# Patient Record
(demographics unavailable — no encounter records)

---

## 2024-11-15 NOTE — DATA REVIEWED
[de-identified] : 2/5/2024: MRI brain.  No evidence of acute ischemia.  Recent right parietal craniotomy, hemorrhagic/proteinaceous subdural fluid collection right cerebral convexity appears grossly stable when compared to recent CT head.  Stable mass effect and midline shift [de-identified] : 7/23/24: Cognitive testing; Global cognitive score: not scored too many components missing More than 1 standard deviation below average in domains: Attention 75.8 Memory, executive function, information processing, visual-spatial function, verbal function and motor skills not assessed due to insufficient data [de-identified] : 5/7/2024: CT head: In comparison to 1/26/2024, interval decrease in right convexity subdural hematoma with minimal midline shift. Given that the prior exam was performed > 3 months ago + heterogeneous density of current subdural hematoma, alternate possibility is interval resolution with recurrence 2/27/2024: CT brain: No significant interval change compared with 2/19/2024. 2/19/2024: CT brain: Stable predominantly low-density right frontal parietal subdural hematoma with midline shift to the left compared with 2/13/2024

## 2024-11-15 NOTE — PHYSICAL EXAM
[General Appearance - Alert] : alert [General Appearance - In No Acute Distress] : in no acute distress [Oriented To Time, Place, And Person] : oriented to person, place, and time [Impaired Insight] : insight and judgment were intact [Person] : oriented to person [Place] : oriented to place [Time] : oriented to time [Registration Intact] : recent registration memory intact [Naming Objects] : no difficulty naming common objects [Repeating Phrases] : no difficulty repeating a phrase [Comprehension] : comprehension intact [Cranial Nerves Optic (II)] : visual acuity intact bilaterally,  visual fields full to confrontation, pupils equal round and reactive to light [Cranial Nerves Oculomotor (III)] : extraocular motion intact [Cranial Nerves Trigeminal (V)] : facial sensation intact symmetrically [Cranial Nerves Facial (VII)] : face symmetrical [Cranial Nerves Vestibulocochlear (VIII)] : hearing was intact bilaterally [Cranial Nerves Glossopharyngeal (IX)] : tongue and palate midline [Cranial Nerves Accessory (XI - Cranial And Spinal)] : head turning and shoulder shrug symmetric [Cranial Nerves Hypoglossal (XII)] : there was no tongue deviation with protrusion [Sensation Tactile Decrease] : light touch was intact [Sensation Pain / Temperature Decrease] : pain and temperature was intact [2+] : Brachioradialis left 2+ [1+] : Patella left 1+ [0] : Ankle jerk left 0 [PERRL With Normal Accommodation] : pupils were equal in size, round, reactive to light, with normal accommodation [Extraocular Movements] : extraocular movements were intact [Full Visual Field] : full visual field [Outer Ear] : the ears and nose were normal in appearance [Hearing Threshold Finger Rub Not Armstrong] : hearing was normal [] : the neck was supple [Neck Cervical Mass (___cm)] : no neck mass was observed [___ / 5] : Visuospatial / Executive: [unfilled] / 5 [___ / 3] : Attention (Serial 7 subtraction): [unfilled] / 3 [___ / 1] : Fluency: [unfilled] / 1 [___ / 2] : Abstraction: [unfilled] / 2 [___ / 5] : Delayed Recall: [unfilled] / 5 [___ / 6] : Orientation: [unfilled] / 6 [Fluency] : fluency not intact [Paresis Pronator Drift Right-Sided] : no pronator drift on the right [Paresis Pronator Drift Left-Sided] : no pronator drift on the left [MocaTotal] : 19 [FreeTextEntry6] : Motor: slightly slow right hand fine motor movements, slight weakness right dorsiflexors, all other muscle groups 5/5. Mild hypertonia - spasticity R > L side  [FreeTextEntry8] : Gait/balance: unstable, tends to drag his right foot

## 2024-11-15 NOTE — REASON FOR VISIT
[Follow-Up: _____] : a [unfilled] follow-up visit [FreeTextEntry1] : For cognitive testing [place] and for seizure medication adjustment

## 2024-11-15 NOTE — DISCUSSION/SUMMARY
[FreeTextEntry1] : 76-year-old RH M with PMHx of sleep apnea, HLD, RA on Rasuvo QW, has had recurring SDH's over the past year requiring 2 bur hole procedures + craniotomy on 1/30/2024, presented for evaluation of short-term memory issues/cognitive decline, occasional headaches, intermittent tinnitus left ear, some word finding difficulty. MRI brain-2/5/24 -no acute ischemia, right parietal craniotomy, hemorrhagic/proteinaceous subdural fluid collection right cerebral convexity appears grossly stable when compared to recent CT head.   # Most likley Vascular Demetia - numerous SDH's. MoCa Score 19/30, most significant deficits noted in visual-spatial, attention and delayed recall, computer-based cognitive test at the last visit was incomplete  unable to score as too many components were missing . Prior MoCA with Dr. Ortiz  Score 15/30 4/8/20240  - I have recommended cognitive exercises -Continue donepezil 5 mg daily, may increase dose to 10 mg at the next visit  # ? CVA/TIA 3/20/2023, Gait imbalance/falls, has right Fuad motor deficits with some hesitancy of speech  - Have recommended using walker for support to prevent falls, patient is agreeable and is not defiant about not using it  # Recurring SDH's, no witnessed seizure, patient is on Keppra taper  -Lower Keppra to 250 Mg daily, at the follow-up visit we will taper further- Would not start any antiplatelet agent given recurrent SDH - Continue atorvastatin 40 mg daily

## 2024-11-15 NOTE — REVIEW OF SYSTEMS
[As Noted in HPI] : as noted in HPI [Memory Lapses or Loss] : memory loss [Leg Weakness] : leg weakness [Poor Coordination] : poor coordination [Tension Headache] : tension-type headaches [Difficulty Walking] : difficulty walking [Frequent Falls] : frequent falls [Negative] : Heme/Lymph [Seizures] : no convulsions [de-identified] : Fogginess, decreased FFM right side

## 2024-11-15 NOTE — HISTORY OF PRESENT ILLNESS
[FreeTextEntry1] : And is here for follow-up visit today, last seen on 7/23/2024, is here for MoCA test as he was unable to complete computer-based test at the last visit. Patient is accompanied by his daughter, he was admitted to the hospital recently with pneumonia, has recovered well.  Patient is very cheerful, continues to have difficulty with short-term memory but is otherwise fully functional, of note, daughter has brought the list of medication, he was started on donepezil 5 mg daily while at the rehab.  Patient has not had any seizures, at the last visit levetiracetam was lowered to 250 Mg twice daily
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
## 2024-12-18 NOTE — REVIEW OF SYSTEMS
[Feeling Tired] : feeling tired [Nocturia] : nocturia [As Noted in HPI] : as noted in HPI [Difficulty Walking] : difficulty walking [Anxiety] : anxiety [Negative] : Heme/Lymph [Feeling Poorly] : not feeling poorly [FreeTextEntry2] : fatigue - but was actually feeling better -weight loss of nearly 16 pounds since last year related to multiple medical problems  [FreeTextEntry3] : denies visual change  [FreeTextEntry4] : Tinnitus [FreeTextEntry5] : s/p mini stroke now on medication. 4/23...NO chest pain [FreeTextEntry7] : Appetite reduced [FreeTextEntry8] : resolved- urine neg for infection. BPH now on tamsulosin and noted elevation in PSA to 4.6 (not appreciated in past) [FreeTextEntry9] : no active joint issues. generalized stiffness but not associated with joint inflammation. [de-identified] : headaches on the R side of head - completely RESOLVED. some slow cognition, but no AMS, + difficulty walking as noted-no focal weakness  [de-identified] : improved sleep  [de-identified] : chronic anemia r/t thalassemia and chronic disease, recurrent infections. Thrombocytopenia x many ys ..

## 2024-12-18 NOTE — PHYSICAL EXAM
[General Appearance - Alert] : alert [General Appearance - In No Acute Distress] : in no acute distress [Sclera] : the sclera and conjunctiva were normal [Extraocular Movements] : extraocular movements were intact [Neck Appearance] : the appearance of the neck was normal [Neck Cervical Mass (___cm)] : no neck mass was observed [Respiration, Rhythm And Depth] : normal respiratory rhythm and effort [Auscultation Breath Sounds / Voice Sounds] : lungs were clear to auscultation bilaterally [Heart Rate And Rhythm] : heart rate was normal and rhythm regular [Heart Sounds] : normal S1 and S2 [Heart Sounds Gallop] : no gallops [Murmurs] : no murmurs [Heart Sounds Pericardial Friction Rub] : no pericardial rub [Arterial Pulses Carotid] : carotid pulses were normal with no bruits [Edema] : there was no peripheral edema [Abdomen Soft] : soft [Cervical Lymph Nodes Enlarged Posterior Bilaterally] : posterior cervical [Cervical Lymph Nodes Enlarged Anterior Bilaterally] : anterior cervical [Supraclavicular Lymph Nodes Enlarged Bilaterally] : supraclavicular [No CVA Tenderness] : no ~M costovertebral angle tenderness [No Spinal Tenderness] : no spinal tenderness [Skin Color & Pigmentation] : normal skin color and pigmentation [Skin Turgor] : normal skin turgor [] : no rash [Deep Tendon Reflexes (DTR)] : deep tendon reflexes were 2+ and symmetric [Sensation] : the sensory exam was normal to light touch and pinprick [No Focal Deficits] : no focal deficits [Oriented To Time, Place, And Person] : oriented to person, place, and time [Impaired Insight] : insight and judgment were intact [Affect] : the affect was normal [Abnormal Walk] : normal gait [Nail Clubbing] : no clubbing  or cyanosis of the fingernails [Musculoskeletal - Swelling] : no joint swelling seen [Motor Tone] : muscle strength and tone were normal [General Appearance - Well-Appearing] : healthy appearing [Exaggerated Use Of Accessory Muscles For Inspiration] : no accessory muscle use [FreeTextEntry1] : No active synovitis.. In past: L knee mild fullness, ttt / warm no redness and POM- all SYNOVITIS RESOLVED.  Last visit  R shoulder lacking fROM- no motion w/out pain. L shoulder now lacks 20-30 degrees full abduction in past -; R hand MCP/ wrist warm/ full and ttt; R foot fullness throughout with mild erythema distally exquisite ttt -R hip no longer POM and fROM; L hip lacks 10-20 degrees but no POM- fROM.. no other changes nodule fully resolved on the R 4 extensor tendon w/ str and motion mnt throughout- and noted R5 tendon nodule with triggering but no locking or pain;  NO tenosynovitis R/L 2 and no triggering; also noted nodules subtle on L elbow (not appreciated); R elbow lacks full extension by 5 degrees (Stable), no synovitis here or any where; bony enlargement on R heel- nt
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
## 2024-12-18 NOTE — HISTORY OF PRESENT ILLNESS
[FreeTextEntry1] : 12/18/24  CC: RA Experiencing no joint pain recently  Last pneumonia 2mo ago, was hospitalized and required rehab - cause of pneumonia not identified. While in rehab was started on Donepezil 5 mg (still mild short term memory issues)  Been off MTX since Has been taking 5mg Prednisone 1x per day instead Received HD Fluzone months ago, PCV 20 last week  No recent sz, levetiracetam was lowered to 250 mg BID   HRCT scan 12/6/24 No infiltrates, stable  Previously seen calcifying and non-calcifying nodules with no change Stable ILD No pericardial thickening  Breathing test (date unknown)  DLCO 78   07/23/24 Continues on prednisone 5mg daily and 20mg of MTX tabs.. insurance not covering pens.. no active synovitis now several months  - PSA elevated recently to 4.6 newly elevated and dx BPH, and started on tamsulosin-reportedly tolerated fairly well with no episodes of hypotension -Did have another fall at home not thought to be related to any hypotension, unclear if it was a choking incident but fell hit his head last had a loss of consciousness, again admitted to [facility] ER.  CT scan x 2 was negative for any worsening SDH -Continues to have considerable issues with balance-associated with generalized stiffness throughout but not related to joint inflammation - working with Dr Topete... underwent neurocognitive assessment today but could not complete study required over 60 minutes and became too exhausted.   - labs 7/11/24 stable but K 3.1 ?? not sure why- mild persistent anemia with low plt (stable since 2020). - last week with nausea/ vomiting and fever to 101.7 no return.. APAP + response.. no other symptoms..  Other than this has not had any kind of infections or fever since several months ago -Continue sertraline 25 mg 1 tablet p.o. daily for overall anxiety related to complicated medical history   1) Seropositive RA:  2) Positive ROSINA:  noted several ys ago w/ neg subserologies.    3) TB exposure:  + PPD, quantiferon.  Completed INH 9 mnths many ys ago.  Needs updated CXR 8/17 neg  4) CVA/ SDH:  3/23.. New.. s/p rosetta procedure
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
## 2024-12-18 NOTE — REVIEW OF SYSTEMS
[Feeling Tired] : feeling tired [Nocturia] : nocturia [As Noted in HPI] : as noted in HPI [Difficulty Walking] : difficulty walking [Anxiety] : anxiety [Negative] : Heme/Lymph [Feeling Poorly] : not feeling poorly [FreeTextEntry2] : fatigue - but was actually feeling better -weight loss of nearly 16 pounds since last year related to multiple medical problems  [FreeTextEntry3] : denies visual change  [FreeTextEntry4] : Tinnitus [FreeTextEntry5] : s/p mini stroke now on medication. 4/23...NO chest pain [FreeTextEntry7] : Appetite reduced [FreeTextEntry8] : resolved- urine neg for infection. BPH now on tamsulosin and noted elevation in PSA to 4.6 (not appreciated in past) [FreeTextEntry9] : no active joint issues. generalized stiffness but not associated with joint inflammation. [de-identified] : headaches on the R side of head - completely RESOLVED. some slow cognition, but no AMS, + difficulty walking as noted-no focal weakness  [de-identified] : improved sleep  [de-identified] : chronic anemia r/t thalassemia and chronic disease, recurrent infections. Thrombocytopenia x many ys ..

## 2024-12-18 NOTE — DATA REVIEWED
[FreeTextEntry1] : Labs:  10/31/2023 stable CBC, Hb 10, CRP 20, ESR 11, nl CMP  9/22 VECTRA 46 5/22 stable but did have ROSINA 1:80 in Cytoplasmic pattern.. nl C3/4 and dsDNA 10  still 1/17 CBC, CMP, UA and ESR/ CRP all nl.   Diagnostics: - HRCT 10/24 + stable nodularity/ and ILD- no progression/ resolution  - CTScan Chest 12/23 no direct comparison but still with GGO in peripheral and subpleural / axial diffuse- scattered - LN as well.. no tommy fibrosis- of note moderate -severe coronary calcifications.  trace pleural effusion  - CT scan of brain 10/2023 resolving hematoma - 6/23 Updated CTScan SSH BL upper lobes GGO- inflammatory vs. infectious and LLL lung nodule.. again unclear if inflammatory - or possible malignancy-  - Bronchoscopy 9/23 neg AFB/ infection - US GC protocol was negative for any inflammatory changes, diffuse atherosclerotic plaques.. most not HD significant   - Cough fully resolved following covid infection 11/21 updated CTscan suggests still some ILD but updated PFTs 4/26/22 excellent.  No need to repeat CTscan at this time 5/20  echo done... need to review but reportedly neg  Chest  CTscan 5/19  stable from many ys ago 2/19US R hand + tendon nodule most likely
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
## 2025-03-11 NOTE — REVIEW OF SYSTEMS
[Feeling Tired] : feeling tired [Nocturia] : nocturia [As Noted in HPI] : as noted in HPI [Difficulty Walking] : difficulty walking [Anxiety] : anxiety [Negative] : Heme/Lymph [Shortness Of Breath] : shortness of breath [Cough] : cough [Feeling Poorly] : not feeling poorly [FreeTextEntry2] : prior to infection was doing better..  [FreeTextEntry3] : denies visual change  [FreeTextEntry4] : Tinnitus mild  [FreeTextEntry5] : s/p mini stroke now on medication. 4/23...NO chest pain- remains resolved..  [FreeTextEntry7] : Appetite reduced [FreeTextEntry8] : resolved- urine neg for infection. BPH now on tamsulosin and noted elevation in PSA to 4.6 (not appreciated in past) [FreeTextEntry9] : no active joint issues. generalized stiffness but not associated with joint inflammation. [de-identified] : headaches on the R side of head - completely RESOLVED. some slow cognition, but no AMS, + difficulty walking as noted-no focal weakness  [de-identified] : improved sleep  [de-identified] : chronic anemia r/t thalassemia and chronic disease, recurrent infections. Thrombocytopenia x many ys ..
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
## 2025-03-11 NOTE — PHYSICAL EXAM
[General Appearance - Alert] : alert [General Appearance - In No Acute Distress] : in no acute distress [General Appearance - Well-Appearing] : healthy appearing [Sclera] : the sclera and conjunctiva were normal [Extraocular Movements] : extraocular movements were intact [Neck Appearance] : the appearance of the neck was normal [Neck Cervical Mass (___cm)] : no neck mass was observed [Respiration, Rhythm And Depth] : normal respiratory rhythm and effort [Exaggerated Use Of Accessory Muscles For Inspiration] : no accessory muscle use [Auscultation Breath Sounds / Voice Sounds] : lungs were clear to auscultation bilaterally [Heart Rate And Rhythm] : heart rate was normal and rhythm regular [Heart Sounds] : normal S1 and S2 [Heart Sounds Gallop] : no gallops [Murmurs] : no murmurs [Heart Sounds Pericardial Friction Rub] : no pericardial rub [Arterial Pulses Carotid] : carotid pulses were normal with no bruits [Edema] : there was no peripheral edema [Cervical Lymph Nodes Enlarged Posterior Bilaterally] : posterior cervical [Cervical Lymph Nodes Enlarged Anterior Bilaterally] : anterior cervical [Supraclavicular Lymph Nodes Enlarged Bilaterally] : supraclavicular [No CVA Tenderness] : no ~M costovertebral angle tenderness [No Spinal Tenderness] : no spinal tenderness [Skin Color & Pigmentation] : normal skin color and pigmentation [Skin Turgor] : normal skin turgor [] : no rash [Deep Tendon Reflexes (DTR)] : deep tendon reflexes were 2+ and symmetric [Sensation] : the sensory exam was normal to light touch and pinprick [No Focal Deficits] : no focal deficits [Oriented To Time, Place, And Person] : oriented to person, place, and time [Impaired Insight] : insight and judgment were intact [Affect] : the affect was normal [Abnormal Walk] : normal gait [Nail Clubbing] : no clubbing  or cyanosis of the fingernails [Musculoskeletal - Swelling] : no joint swelling seen [Motor Tone] : muscle strength and tone were normal [FreeTextEntry1] : R ankle swelling fullness/ warmth TTP - no redness; L wrist slightly full but no warmth/ r/t; .. In past: L knee mild fullness, ttt / warm no redness and POM- all SYNOVITIS RESOLVED.  Last visit  R shoulder lacking fROM- no motion w/out pain. L shoulder now lacks 20-30 degrees full abduction in past -; R hand MCP/ wrist warm/ full and ttt; R foot fullness throughout with mild erythema distally exquisite ttt -R hip no longer POM and fROM; L hip lacks 10-20 degrees but no POM- fROM.. no other changes nodule fully resolved on the R 4 extensor tendon w/ str and motion mnt throughout- and noted R5 tendon nodule with triggering but no locking or pain;  NO tenosynovitis R/L 2 and no triggering; also noted nodules subtle on L elbow (not appreciated); R elbow lacks full extension by 5 degrees (Stable), no synovitis here or any where; bony enlargement on R heel- nt

## 2025-03-24 NOTE — DATA REVIEWED
[de-identified] : 2/5/2024: MRI brain. No evidence of acute ischemia.  Recent right parietal craniotomy, hemorrhagic/proteinaceous subdural fluid collection right cerebral convexity appears grossly stable when compared to recent CT head.  Stable mass effect and midline shift [de-identified] : 7/23/24: Cognitive testing; Global cognitive score: not scored too many components missing More than 1 standard deviation below average in domains: Attention 75.8 Memory, executive function, information processing, visual-spatial function, verbal function and motor skills not assessed due to insufficient data [de-identified] : 5/7/2024: CT head: In comparison to 1/26/2024, interval decrease in right convexity subdural hematoma with minimal midline shift. Given that the prior exam was performed > 3 months ago + heterogeneous density of current subdural hematoma, alternate possibility is interval resolution with recurrence 2/27/2024: CT brain: No significant interval change compared with 2/19/2024. 2/19/2024: CT brain: Stable predominantly low-density right frontal parietal subdural hematoma with midline shift to the left compared with 2/13/2024

## 2025-03-24 NOTE — REVIEW OF SYSTEMS
[As Noted in HPI] : as noted in HPI [Memory Lapses or Loss] : memory loss [Poor Coordination] : poor coordination [Tension Headache] : tension-type headaches [Negative] : Heme/Lymph [Leg Weakness] : no leg weakness [Seizures] : no convulsions [Difficulty Walking] : no difficulty walking [Frequent Falls] : not falling [de-identified] : Fogginess, decreased FFM right side - IMPROVED

## 2025-03-24 NOTE — DISCUSSION/SUMMARY
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 MRI brain-2/5/24 -no acute ischemia, right parietal craniotomy, hemorrhagic/proteinaceous subdural fluid collection right cerebral convexity appears grossly stable when compared to recent CT head.   # Most likley Vascular Demetia - numerous SDH's. MoCa Score 19/30, most significant deficits noted in visual-spatial, attention and delayed recall, computer-based cognitive test at the last visit was incomplete  unable to score as too many components were missing . Prior MoCA  score 15/30 (Dr. Ortiz) 4/8/20240  - I have recommended cognitive exercises -Continue donepezil, increase dose to 10 mgs  # ? CVA/TIA 3/20/2023, Gait imbalance/falls, minimal R Fuad motor deficits with some hesitancy of speech  - Have recommended caution while walking, use a walker for longer walks, patient is agreeable and is not defiant about not using it  # Recurring SDH's, no witnessed seizure  - Stop Keppra 250 Mg daily - Continue atorvastatin 40 mg daily

## 2025-03-24 NOTE — REASON FOR VISIT
[Follow-Up: _____] : a [unfilled] follow-up visit [Spouse] : spouse [FreeTextEntry1] : For Dementia, gait imbalance and for seizure medication taper

## 2025-03-24 NOTE — HISTORY OF PRESENT ILLNESS
[FreeTextEntry1] : Pt is here for follow-up visit today, last seen on 11/1524, Patient has not had any seizures, at the last visit levetiracetam was lowered to 250 Mg daily, As per wife who is accompanying him, he has not had any change in mental status, or seizure-like events, he has improved remarkably.  Patient is walking independently, he has not had any falls, although wife reports that while walking longer stretches, he feels little unsteady.   Patient did have a RA flareup recently, was treated with steroid taper.  Patient has not had worsening of cognition but continues to have short-term memory issues, he is on donepezil 5 Mg daily,

## 2025-03-24 NOTE — PHYSICAL EXAM
[General Appearance - Alert] : alert [General Appearance - In No Acute Distress] : in no acute distress [Oriented To Time, Place, And Person] : oriented to person, place, and time [Impaired Insight] : insight and judgment were intact [Person] : oriented to person [Place] : oriented to place [Time] : oriented to time [Registration Intact] : recent registration memory intact [Naming Objects] : no difficulty naming common objects [Repeating Phrases] : no difficulty repeating a phrase [Comprehension] : comprehension intact [Cranial Nerves Optic (II)] : visual acuity intact bilaterally,  visual fields full to confrontation, pupils equal round and reactive to light [Cranial Nerves Oculomotor (III)] : extraocular motion intact [Cranial Nerves Trigeminal (V)] : facial sensation intact symmetrically [Cranial Nerves Facial (VII)] : face symmetrical [Cranial Nerves Vestibulocochlear (VIII)] : hearing was intact bilaterally [Cranial Nerves Glossopharyngeal (IX)] : tongue and palate midline [Cranial Nerves Accessory (XI - Cranial And Spinal)] : head turning and shoulder shrug symmetric [Cranial Nerves Hypoglossal (XII)] : there was no tongue deviation with protrusion [Sensation Tactile Decrease] : light touch was intact [Sensation Pain / Temperature Decrease] : pain and temperature was intact [2+] : Brachioradialis left 2+ [1+] : Patella left 1+ [0] : Ankle jerk left 0 [PERRL With Normal Accommodation] : pupils were equal in size, round, reactive to light, with normal accommodation [Extraocular Movements] : extraocular movements were intact [Full Visual Field] : full visual field [Outer Ear] : the ears and nose were normal in appearance [Hearing Threshold Finger Rub Not [place]] : hearing was normal [] : the neck was supple [Neck Cervical Mass (___cm)] : no neck mass was observed [Fluency] : fluency not intact [Paresis Pronator Drift Right-Sided] : no pronator drift on the right [Paresis Pronator Drift Left-Sided] : no pronator drift on the left [FreeTextEntry6] : Motor: slightly slow right hand fine motor movements, slight weakness right dorsiflexors, all other muscle groups 5/5. Mild hypertonia - spasticity R > L side  [FreeTextEntry8] : Gait/balance: unstable, tends to drag his right foot
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
## 2025-06-25 NOTE — REVIEW OF SYSTEMS
[Feeling Poorly] : not feeling poorly [Feeling Tired] : feeling tired [Shortness Of Breath] : shortness of breath [Cough] : cough [Nocturia] : nocturia [As Noted in HPI] : as noted in HPI [Difficulty Walking] : difficulty walking [Anxiety] : anxiety [Negative] : Heme/Lymph [FreeTextEntry3] : denies visual change  [FreeTextEntry2] : prior to infection was doing better..  [FreeTextEntry4] : Tinnitus mild  [FreeTextEntry5] : s/p mini stroke now on medication. 4/23...NO chest pain- remains resolved..  [FreeTextEntry7] : Appetite reduced [FreeTextEntry8] : resolved- urine neg for infection. BPH now on tamsulosin and noted elevation in PSA to 4.6 (not appreciated in past) [FreeTextEntry9] : no active joint issues. generalized stiffness but not associated with joint inflammation. [de-identified] : headaches on the R side of head - completely RESOLVED. some slow cognition, but no AMS, + difficulty walking as noted-no focal weakness  [de-identified] : improved sleep  [de-identified] : chronic anemia r/t thalassemia and chronic disease, recurrent infections. Thrombocytopenia x many ys ..

## 2025-06-25 NOTE — HISTORY OF PRESENT ILLNESS
[FreeTextEntry1] : 6/25/25   Plan 3/11/25: - Resume MTX at lower dose - 6 tabs per week (15mg) when done with antibiotics    - given 40 mg kenolog IM injection L arm today for immediate relief migrating joint inflammation..   - continue with 5 mg prednisone x 2 weeks, then try 5 mg every other day  long term... let's see what happens.  Monitor for infections and let me know (this is because every time taper off prednisone - get exacerbation of lung issues)   - Continue  Sertraline for now.. but may stop in future.   - Follow-up in 3mo - new blood work to be done beforehand (home draw)  - resume PT next week...   - Make sure you do deep breathing exercises every hour that you are awake..   - continue w/ Dr Wellington (pulm)    - RPA 3 m            3/11/25 Acute severe pulmonary infection with temp to 102d 2/27/25... with MSC- at least 4-6 days without fever, following tx w/ doxy.. - held dose of MTX..  Had been off prednisone .. but with recent infection again return migratory joint inflammation.. with red swollen joints.. today despite resuming 5 mg pred R ankle, L wrist, knees... and L TMJ (severe pain and limited ROM) Labs 3/7/25 - stable Hb 10, MCV 92, with CRP now at 48-, ESR 19,  after nearly 7 days antibiotics..  Seen by pulm and advised to continue antibiotic x 14 days- marked improvement in pulm status..   HRCT scan 12/6/24 No infiltrates, stable  Previously seen calcifying and non-calcifying nodules with no change Stable ILD No pericardial thickening  Breathing test (date unknown)  DLCO 78   1) Seropositive RA:  2) Positive ROSINA:  noted several ys ago w/ neg subserologies.    3) TB exposure:  + PPD, quantiferon.  Completed INH 9 mnths many ys ago.  Needs updated CXR 8/17 neg  4) CVA/ SDH:  3/23.. New.. s/p rosetta procedure